# Patient Record
Sex: MALE | Race: WHITE | NOT HISPANIC OR LATINO | Employment: PART TIME | URBAN - NONMETROPOLITAN AREA
[De-identification: names, ages, dates, MRNs, and addresses within clinical notes are randomized per-mention and may not be internally consistent; named-entity substitution may affect disease eponyms.]

---

## 2024-02-08 ENCOUNTER — APPOINTMENT (OUTPATIENT)
Dept: PRIMARY CARE | Facility: CLINIC | Age: 49
End: 2024-02-08
Payer: COMMERCIAL

## 2024-02-27 ENCOUNTER — PATIENT OUTREACH (OUTPATIENT)
Dept: PRIMARY CARE | Facility: CLINIC | Age: 49
End: 2024-02-27
Payer: COMMERCIAL

## 2024-02-27 ENCOUNTER — TELEPHONE (OUTPATIENT)
Dept: PRIMARY CARE | Facility: CLINIC | Age: 49
End: 2024-02-27
Payer: COMMERCIAL

## 2024-02-27 NOTE — TELEPHONE ENCOUNTER
HELDER FROM [facility]  CALLING.    PATIENT DISCHARGED FROM HOSPITAL.   WILL YOU FOLLOW FOR HOME CARE ?     [phone]   OPTION 1

## 2024-02-27 NOTE — PROGRESS NOTES
Discharge Facility:  [facility]   Discharge Diagnosis:  Brain mass  cva   Admission Date:  2/20/24   Discharge Date:   2/26/24     PCP Appointment Date:  3/7/24   Specialist Appointment Date:   3/8/24   Hospital Encounter and Summary: Linked  See discharge assessment below for further details    Engagement  Call Start Time: 1230 (2/27/2024 12:36 PM)    Medications  Medications reviewed with patient/caregiver?: Yes (2/27/2024 12:36 PM)  Is the patient having any side effects they believe may be caused by any medication additions or changes?: No (2/27/2024 12:36 PM)  Does the patient have all medications ordered at discharge?: Yes (2/27/2024 12:36 PM)  Prescription Comments: scripts losartan , norvasc, keppra, decadron, protonix, senna, (2/27/2024 12:36 PM)  Is the patient taking all medications as directed (includes completed medication regime)?: Yes (2/27/2024 12:36 PM)  Care Management Interventions: Provided patient education (2/27/2024 12:36 PM)  Medication Comments: Pt denies problems obtaining or affording medication (2/27/2024 12:36 PM)    Appointments  Does the patient have a primary care provider?: Yes (2/27/2024 12:36 PM)  Care Management Interventions: Verified appointment date/time/provider (2/27/2024 12:36 PM)  Has the patient kept scheduled appointments due by today?: Yes (2/27/2024 12:36 PM)  Care Management Interventions: Advised patient to keep appointment (2/27/2024 12:36 PM)    Self Management  What is the home health agency?: yes (2/27/2024 12:36 PM)  Has home health visited the patient within 72 hours of discharge?: Yes (2/27/2024 12:36 PM)  What Durable Medical Equipment (DME) was ordered?: cane (2/27/2024 12:36 PM)    Patient Teaching  Does the patient have access to their discharge instructions?: Yes (2/27/2024 12:36 PM)  Care Management Interventions: Reviewed instructions with patient (2/27/2024 12:36 PM)  What is the patient's perception of their health status since  discharge?: Improving (2/27/2024 12:36 PM)  Is the patient/caregiver able to teach back the hierarchy of who to call/visit for symptoms/problems? PCP, Specialist, Home Health nurse, Urgent Care, ED, 911: Yes (2/27/2024 12:36 PM)  Patient/Caregiver Education Comments: This CM spoke with pt via phone. Pt reports doing well at home since discharge. New meds reviewed. Pt denies CP and SOB. Pt aware of my availability for non-emergent concerns. Contact info provided to patient (2/27/2024 12:36 PM)    '      losartan (COZAAR) 50 MG tablet  Take 1 (one) tablet (50 mg total) by mouth daily with lunch . 90 tablet  0 02/26/2024 05/26/2024   amLODIPine (NORVASC) 10 MG tablet  Take 1 (one) tablet (10 mg total) by mouth daily Start: 02/27/24. 90 tablet  0 02/27/2024 05/27/2024   levETIRAcetam (KEPPRA) 500 MG tablet  Take 1 (one) tablet (500 mg total) by mouth 2 (two) times a day . 60 tablet  0 02/26/2024 03/27/2024   dexAMETHasone (DECADRON) 4 MG tablet  Take 1 (one) tablet (4 mg total) by mouth See Admin Instructions 4 mg 3 times a day for 3 days then 4 mg 2 times a day for 3 days then 4 mg daily for 3 days . 18 tablet  0 02/26/2024 03/27/2024   pantoprazole (PROTONIX) 40 MG tablet  Take 1 (one) tablet (40 mg total) by mouth daily . 30 tablet  0 02/26/2024 03/27/2024   senna (SENOKOT) 8.6 mg tablet  Take 1 (one) tablet (8.6 mg total) by mouth 2 (two) times a day as needed for constipation . 30 tablet  0 02/26/2024 03/27/2024   ondansetron (ZOFRAN-ODT) 4 MG disintegrating tablet  Dissolve 1 (one) tablet (4 mg total) on top of tongue every 6 (six) hours as needed . 20 tablet  0 02/26/2024 03/04/2024   losartan (COZAAR) 50 MG tablet  Take 1 (one) tablet (50 mg total) by mouth daily with lunch . 30 tablet  0 02/26/2024 02/26/2024   amLODIPine (NORVASC) 10 MG tablet

## 2024-02-28 ENCOUNTER — TELEPHONE (OUTPATIENT)
Dept: PRIMARY CARE | Facility: CLINIC | Age: 49
End: 2024-02-28
Payer: COMMERCIAL

## 2024-02-28 NOTE — TELEPHONE ENCOUNTER
BRYANNA: SHIRLEY,  WITH [facility] CALLED TO REPORT PATIENT HAD CRANIOTOMY. MASS OF UNCERTAIN BEHAVIOR. IS SCHEDULED FOR FOLLOW UP 3/11/2024. SHE IS TRYING TO OBTAIN PATHOLOGY FOR YOUR REVIEW.           SHIRLEY: [phone]

## 2024-03-05 ENCOUNTER — TELEPHONE (OUTPATIENT)
Dept: PRIMARY CARE | Facility: CLINIC | Age: 49
End: 2024-03-05
Payer: COMMERCIAL

## 2024-03-05 NOTE — TELEPHONE ENCOUNTER
MARY ELLEN VALENCIA, HOME HEALTH PHYSICAL THERAPIST, CALLED REQUESTING A SCRIPT FOR LONG HANDLE BACK SPONGE, SOCK AID, REACHER, TOILET SAFETY FRAME, AND EXTENDED BATH TRANSFER BENCH. PLEASE ADVISE

## 2024-03-07 ENCOUNTER — APPOINTMENT (OUTPATIENT)
Dept: PRIMARY CARE | Facility: CLINIC | Age: 49
End: 2024-03-07
Payer: COMMERCIAL

## 2024-03-07 ENCOUNTER — OFFICE VISIT (OUTPATIENT)
Dept: PRIMARY CARE | Facility: CLINIC | Age: 49
End: 2024-03-07
Payer: COMMERCIAL

## 2024-03-07 VITALS
HEIGHT: 68 IN | DIASTOLIC BLOOD PRESSURE: 86 MMHG | BODY MASS INDEX: 32.05 KG/M2 | OXYGEN SATURATION: 99 % | SYSTOLIC BLOOD PRESSURE: 138 MMHG | WEIGHT: 211.5 LBS | HEART RATE: 116 BPM

## 2024-03-07 DIAGNOSIS — I10 ESSENTIAL HYPERTENSION, BENIGN: Primary | ICD-10-CM

## 2024-03-07 PROCEDURE — 3079F DIAST BP 80-89 MM HG: CPT | Performed by: FAMILY MEDICINE

## 2024-03-07 PROCEDURE — 99213 OFFICE O/P EST LOW 20 MIN: CPT | Performed by: FAMILY MEDICINE

## 2024-03-07 PROCEDURE — 3075F SYST BP GE 130 - 139MM HG: CPT | Performed by: FAMILY MEDICINE

## 2024-03-07 PROCEDURE — 1036F TOBACCO NON-USER: CPT | Performed by: FAMILY MEDICINE

## 2024-03-07 RX ORDER — LABETALOL 100 MG/1
100 TABLET, FILM COATED ORAL 2 TIMES DAILY
Qty: 60 TABLET | Refills: 1 | Status: SHIPPED | OUTPATIENT
Start: 2024-03-07 | End: 2024-04-30 | Stop reason: SDUPTHER

## 2024-03-07 RX ORDER — ONDANSETRON 4 MG/1
4 TABLET, ORALLY DISINTEGRATING ORAL EVERY 6 HOURS PRN
COMMUNITY
Start: 2024-02-26 | End: 2024-05-13 | Stop reason: ALTCHOICE

## 2024-03-07 RX ORDER — METAXALONE 800 MG/1
800 TABLET ORAL EVERY 6 HOURS PRN
COMMUNITY
End: 2024-05-13 | Stop reason: ALTCHOICE

## 2024-03-07 RX ORDER — LOSARTAN POTASSIUM 50 MG/1
50 TABLET ORAL
COMMUNITY
Start: 2024-02-26 | End: 2024-03-07 | Stop reason: ALTCHOICE

## 2024-03-07 RX ORDER — PANTOPRAZOLE SODIUM 40 MG/1
40 TABLET, DELAYED RELEASE ORAL
COMMUNITY
Start: 2024-02-26 | End: 2024-05-13 | Stop reason: ALTCHOICE

## 2024-03-07 RX ORDER — LEVETIRACETAM 500 MG/1
500 TABLET ORAL 2 TIMES DAILY
COMMUNITY
Start: 2024-02-26 | End: 2024-03-27

## 2024-03-07 RX ORDER — DEXAMETHASONE 4 MG/1
4 TABLET ORAL
COMMUNITY
Start: 2024-02-26 | End: 2024-05-13 | Stop reason: ALTCHOICE

## 2024-03-07 RX ORDER — AMLODIPINE BESYLATE 10 MG/1
10 TABLET ORAL
COMMUNITY
Start: 2024-02-27 | End: 2024-04-30 | Stop reason: SDUPTHER

## 2024-03-07 ASSESSMENT — ENCOUNTER SYMPTOMS
DIFFICULTY URINATING: 0
FATIGUE: 1
FLANK PAIN: 0
HEMATURIA: 0
PALPITATIONS: 0
FREQUENCY: 1
DIARRHEA: 0
COUGH: 0
SHORTNESS OF BREATH: 0
CONSTIPATION: 0
NAUSEA: 0
DYSURIA: 0

## 2024-03-07 ASSESSMENT — PATIENT HEALTH QUESTIONNAIRE - PHQ9
1. LITTLE INTEREST OR PLEASURE IN DOING THINGS: NOT AT ALL
2. FEELING DOWN, DEPRESSED OR HOPELESS: NOT AT ALL
SUM OF ALL RESPONSES TO PHQ9 QUESTIONS 1 AND 2: 0

## 2024-03-07 NOTE — PROGRESS NOTES
"Subjective   Patient ID: Raymundo Mora is a 48 y.o. male who presents for Annual Exam.    HPI   Has been on amlodipine the whole time.  The lisinopril allergy was remote with another doctor.  About 2 years I started him on hydrochlorothiazide, but he never took it.  In the hospital after the diagnosis of the brain tumor, they started him on losartan.  But has had increasing urination issues with that medication.    Continue amlodipine milligrams daily.  Stop losartan  And labetalol 100 mg twice a day  Office visit 2 weeks    Has a follow-up with Dr. Flores tomorrow, status post surgery.    Review of Systems   Constitutional:  Positive for fatigue.   Respiratory:  Negative for cough and shortness of breath.    Cardiovascular:  Negative for chest pain and palpitations.   Gastrointestinal:  Negative for constipation, diarrhea and nausea.   Genitourinary:  Positive for frequency and urgency. Negative for difficulty urinating, dysuria, flank pain and hematuria.       Objective   /86   Pulse (!) 116   Ht 1.727 m (5' 8\")   Wt 95.9 kg (211 lb 8 oz)   SpO2 99%   BMI 32.16 kg/m²     Physical Exam  Constitutional:       Appearance: Normal appearance.   HENT:      Head: Normocephalic and atraumatic.   Cardiovascular:      Rate and Rhythm: Normal rate and regular rhythm.      Heart sounds: Normal heart sounds.   Pulmonary:      Effort: Pulmonary effort is normal.      Breath sounds: Normal breath sounds.   Skin:     General: Skin is warm and dry.   Neurological:      General: No focal deficit present.      Mental Status: He is alert and oriented to person, place, and time.   Psychiatric:         Mood and Affect: Mood normal.         Behavior: Behavior normal.         Thought Content: Thought content normal.         Judgment: Judgment normal.         Assessment/Plan   Problem List Items Addressed This Visit             ICD-10-CM    Essential hypertension, benign - Primary I10    Relevant Medications    labetalol " (Normodyne) 100 mg tablet

## 2024-03-11 ENCOUNTER — APPOINTMENT (OUTPATIENT)
Dept: PRIMARY CARE | Facility: CLINIC | Age: 49
End: 2024-03-11
Payer: COMMERCIAL

## 2024-03-12 ENCOUNTER — PATIENT OUTREACH (OUTPATIENT)
Dept: PRIMARY CARE | Facility: CLINIC | Age: 49
End: 2024-03-12
Payer: COMMERCIAL

## 2024-03-12 NOTE — PROGRESS NOTES
Unable to reach patient for call back after patient's follow up appointment with PCP.   FRANCIAM with call back number for patient to call if needed   If no voicemail available call attempts x 2 were made to contact the patient to assist with any questions or concerns patient may have.

## 2024-03-13 ENCOUNTER — APPOINTMENT (OUTPATIENT)
Dept: PRIMARY CARE | Facility: CLINIC | Age: 49
End: 2024-03-13
Payer: COMMERCIAL

## 2024-03-15 PROBLEM — I61.9 CVA (CEREBROVASCULAR ACCIDENT DUE TO INTRACEREBRAL HEMORRHAGE) (MULTI): Status: ACTIVE | Noted: 2024-02-20

## 2024-03-15 PROBLEM — G93.89 BRAIN MASS: Status: ACTIVE | Noted: 2024-02-21

## 2024-03-15 PROBLEM — Z98.890 HX OF CRANIOTOMY: Status: ACTIVE | Noted: 2024-03-15

## 2024-03-25 ENCOUNTER — APPOINTMENT (OUTPATIENT)
Dept: PRIMARY CARE | Facility: CLINIC | Age: 49
End: 2024-03-25
Payer: COMMERCIAL

## 2024-04-12 ENCOUNTER — PATIENT OUTREACH (OUTPATIENT)
Dept: PRIMARY CARE | Facility: CLINIC | Age: 49
End: 2024-04-12
Payer: COMMERCIAL

## 2024-04-12 NOTE — PROGRESS NOTES
Unable to reach patient for discharge follow up call.   LVM with call back number for patient to call if needed   If no voicemail available call attempts x 2 were made to contact the patient to assist with any questions or concerns patient may have.

## 2024-04-30 ENCOUNTER — APPOINTMENT (OUTPATIENT)
Dept: PRIMARY CARE | Facility: CLINIC | Age: 49
End: 2024-04-30
Payer: COMMERCIAL

## 2024-04-30 DIAGNOSIS — I10 ESSENTIAL HYPERTENSION, BENIGN: ICD-10-CM

## 2024-04-30 RX ORDER — CARVEDILOL PHOSPHATE 20 MG/1
20 CAPSULE, EXTENDED RELEASE ORAL DAILY
Qty: 90 CAPSULE | Refills: 3 | OUTPATIENT
Start: 2024-04-30 | End: 2025-04-30

## 2024-04-30 RX ORDER — LABETALOL 100 MG/1
100 TABLET, FILM COATED ORAL 2 TIMES DAILY
Qty: 180 TABLET | Refills: 3 | Status: SHIPPED | OUTPATIENT
Start: 2024-04-30 | End: 2025-04-30

## 2024-04-30 RX ORDER — AMLODIPINE BESYLATE 10 MG/1
10 TABLET ORAL
Qty: 90 TABLET | Refills: 3 | Status: SHIPPED | OUTPATIENT
Start: 2024-04-30 | End: 2025-04-30

## 2024-05-09 ENCOUNTER — PATIENT OUTREACH (OUTPATIENT)
Dept: PRIMARY CARE | Facility: CLINIC | Age: 49
End: 2024-05-09
Payer: COMMERCIAL

## 2024-05-09 NOTE — PROGRESS NOTES
Final call back to assess needs..  LVM for pt   Contact info provided.    [phone]- pt returned call. Pt states he Is doing well has no questions or concerns at this time. Contact info provided.

## 2024-05-13 ENCOUNTER — OFFICE VISIT (OUTPATIENT)
Dept: PRIMARY CARE | Facility: CLINIC | Age: 49
End: 2024-05-13
Payer: COMMERCIAL

## 2024-05-13 VITALS
HEIGHT: 68 IN | SYSTOLIC BLOOD PRESSURE: 124 MMHG | HEART RATE: 88 BPM | WEIGHT: 206.4 LBS | DIASTOLIC BLOOD PRESSURE: 82 MMHG | BODY MASS INDEX: 31.28 KG/M2 | OXYGEN SATURATION: 99 %

## 2024-05-13 DIAGNOSIS — I10 ESSENTIAL HYPERTENSION, BENIGN: Primary | ICD-10-CM

## 2024-05-13 PROCEDURE — 1036F TOBACCO NON-USER: CPT | Performed by: FAMILY MEDICINE

## 2024-05-13 PROCEDURE — 99213 OFFICE O/P EST LOW 20 MIN: CPT | Performed by: FAMILY MEDICINE

## 2024-05-13 PROCEDURE — 3074F SYST BP LT 130 MM HG: CPT | Performed by: FAMILY MEDICINE

## 2024-05-13 PROCEDURE — 3079F DIAST BP 80-89 MM HG: CPT | Performed by: FAMILY MEDICINE

## 2024-05-13 RX ORDER — GABAPENTIN 400 MG/1
400 CAPSULE ORAL 3 TIMES DAILY
COMMUNITY

## 2024-05-13 ASSESSMENT — ENCOUNTER SYMPTOMS
HEADACHES: 0
PALPITATIONS: 0
FATIGUE: 0
SHORTNESS OF BREATH: 0

## 2024-05-13 NOTE — PROGRESS NOTES
"Subjective   Patient ID: Raymundo Mora is a 48 y.o. male who presents for Follow-up (Patient here for follow-up.  Denies any complaints at present.).    HPI   Overall he is doing great.  He is feeling well.  He did have a little bit of troubles with the Keppra and was switched over to gabapentin for seizure prophylaxis.  He feels much better on that medicine.  Tolerating the amlodipine has been on that for a while.  Blood pressure is much better on the labetalol, a little bit of the ED.  But it is okay for now.  His weight is down between 5 and 10 pounds with better diet.  At this point since things are stable I do not want to change anything.  If needed in the future we can try to stop the labetalol and use something else if needed.  Will see how his weight loss goes on his blood pressure.    Still following up with neurosurgery.  Not sure if he absolutely needs a referral to oncology but they were going to do that to the [facility].    Office visit 3 months    Review of Systems   Constitutional:  Negative for fatigue.   Respiratory:  Negative for shortness of breath.    Cardiovascular:  Negative for chest pain and palpitations.   Neurological:  Negative for headaches.       Objective   /82 (BP Location: Left arm, Patient Position: Sitting, BP Cuff Size: Adult)   Pulse 88   Ht 1.727 m (5' 8\")   Wt 93.6 kg (206 lb 6.4 oz)   SpO2 99%   BMI 31.38 kg/m²     Physical Exam  Constitutional:       Appearance: Normal appearance.   HENT:      Head: Normocephalic and atraumatic.   Cardiovascular:      Rate and Rhythm: Normal rate and regular rhythm.      Heart sounds: Normal heart sounds.   Pulmonary:      Effort: Pulmonary effort is normal.      Breath sounds: Normal breath sounds.   Skin:     General: Skin is warm and dry.   Neurological:      General: No focal deficit present.      Mental Status: He is alert and oriented to person, place, and time.   Psychiatric:         Mood and Affect: Mood normal.    "      Behavior: Behavior normal.         Thought Content: Thought content normal.         Judgment: Judgment normal.         Assessment/Plan   Problem List Items Addressed This Visit             ICD-10-CM    Essential hypertension, benign - Primary I10

## 2024-08-13 ENCOUNTER — APPOINTMENT (OUTPATIENT)
Dept: PRIMARY CARE | Facility: CLINIC | Age: 49
End: 2024-08-13
Payer: COMMERCIAL

## 2024-09-09 ENCOUNTER — APPOINTMENT (OUTPATIENT)
Age: 49
End: 2024-09-09
Payer: COMMERCIAL

## 2024-09-09 VITALS
BODY MASS INDEX: 32.89 KG/M2 | OXYGEN SATURATION: 92 % | WEIGHT: 217 LBS | SYSTOLIC BLOOD PRESSURE: 114 MMHG | HEART RATE: 85 BPM | HEIGHT: 68 IN | DIASTOLIC BLOOD PRESSURE: 75 MMHG

## 2024-09-09 DIAGNOSIS — M54.50 CHRONIC MIDLINE LOW BACK PAIN WITHOUT SCIATICA: ICD-10-CM

## 2024-09-09 DIAGNOSIS — I10 ESSENTIAL HYPERTENSION, BENIGN: Primary | ICD-10-CM

## 2024-09-09 DIAGNOSIS — G89.29 CHRONIC MIDLINE LOW BACK PAIN WITHOUT SCIATICA: ICD-10-CM

## 2024-09-09 PROBLEM — G93.89 BRAIN MASS: Status: RESOLVED | Noted: 2024-02-21 | Resolved: 2024-09-09

## 2024-09-09 PROBLEM — Z87.898 HISTORY OF BRAIN TUMOR: Status: ACTIVE | Noted: 2024-09-09

## 2024-09-09 PROBLEM — I61.9 CVA (CEREBROVASCULAR ACCIDENT DUE TO INTRACEREBRAL HEMORRHAGE) (MULTI): Status: RESOLVED | Noted: 2024-02-20 | Resolved: 2024-09-09

## 2024-09-09 PROCEDURE — 3078F DIAST BP <80 MM HG: CPT | Performed by: FAMILY MEDICINE

## 2024-09-09 PROCEDURE — 3074F SYST BP LT 130 MM HG: CPT | Performed by: FAMILY MEDICINE

## 2024-09-09 PROCEDURE — 99214 OFFICE O/P EST MOD 30 MIN: CPT | Performed by: FAMILY MEDICINE

## 2024-09-09 PROCEDURE — 3008F BODY MASS INDEX DOCD: CPT | Performed by: FAMILY MEDICINE

## 2024-09-09 PROCEDURE — 1036F TOBACCO NON-USER: CPT | Performed by: FAMILY MEDICINE

## 2024-09-09 RX ORDER — GABAPENTIN 400 MG/1
400 CAPSULE ORAL 3 TIMES DAILY
Qty: 270 CAPSULE | Refills: 3 | Status: SHIPPED | OUTPATIENT
Start: 2024-09-09

## 2024-09-09 RX ORDER — AMLODIPINE BESYLATE 10 MG/1
10 TABLET ORAL
Qty: 90 TABLET | Refills: 3 | Status: SHIPPED | OUTPATIENT
Start: 2024-09-09 | End: 2025-09-09

## 2024-09-09 RX ORDER — LABETALOL 100 MG/1
100 TABLET, FILM COATED ORAL 2 TIMES DAILY
Qty: 180 TABLET | Refills: 3 | Status: SHIPPED | OUTPATIENT
Start: 2024-09-09 | End: 2025-09-09

## 2024-09-09 ASSESSMENT — PATIENT HEALTH QUESTIONNAIRE - PHQ9
1. LITTLE INTEREST OR PLEASURE IN DOING THINGS: SEVERAL DAYS
SUM OF ALL RESPONSES TO PHQ9 QUESTIONS 1 & 2: 1
2. FEELING DOWN, DEPRESSED OR HOPELESS: NOT AT ALL

## 2024-09-09 ASSESSMENT — ENCOUNTER SYMPTOMS
HEADACHES: 0
BACK PAIN: 1
PALPITATIONS: 0
SHORTNESS OF BREATH: 0
FATIGUE: 0

## 2024-09-09 ASSESSMENT — PAIN SCALES - GENERAL: PAINLEVEL: 3

## 2024-09-09 NOTE — PROGRESS NOTES
"Subjective   Patient ID: Raymundo Mora is a 48 y.o. male who presents for Follow-up (3 mos follow up).    HPI   Does not need the gabapentin for seizure prophylaxis anymore.  Dr. Cardona pain management had him on it for his chronic low back pain.  Will refill the medication today.  I have personally reviewed the [facility] report for this patient. This report is viewed and saved into the electronic medical record. I have considered the risks of abuse, dependence, addiction and diversion. I believe that it is clinically appropriate for this patient to be prescribed this medication.    Blood pressure is doing okay on the amlodipine the labetalol.  Since things are where they need to be is not having any troubles with the medications will not change any doses at this time.  No lightheadedness or dizziness.    Did have a PSA test done last year which was less than 1.  Also did lipid levels.  Will talk about colon cancer screening at the next office visit, likely wellness.  Office visit 6 months.  Will need labs, we will talk about those labs first before we order them.    Review of Systems   Constitutional:  Negative for fatigue.   Respiratory:  Negative for shortness of breath.    Cardiovascular:  Negative for chest pain and palpitations.   Musculoskeletal:  Positive for back pain and gait problem.   Skin:  Negative for rash.   Neurological:  Negative for headaches.       Objective   /75   Pulse 85   Ht 1.727 m (5' 8\")   Wt 98.4 kg (217 lb)   SpO2 92%   BMI 32.99 kg/m²     Physical Exam  Constitutional:       Appearance: Normal appearance.   HENT:      Head: Normocephalic and atraumatic.   Cardiovascular:      Rate and Rhythm: Normal rate and regular rhythm.      Heart sounds: Normal heart sounds.   Pulmonary:      Effort: Pulmonary effort is normal.      Breath sounds: Normal breath sounds.   Skin:     General: Skin is warm and dry.   Neurological:      General: No focal deficit present.      Mental Status: He " is alert and oriented to person, place, and time.   Psychiatric:         Mood and Affect: Mood normal.         Behavior: Behavior normal.         Thought Content: Thought content normal.         Judgment: Judgment normal.         Assessment/Plan   Problem List Items Addressed This Visit             ICD-10-CM    Essential hypertension, benign - Primary I10    Relevant Medications    amLODIPine (Norvasc) 10 mg tablet    labetalol (Normodyne) 100 mg tablet    Chronic midline low back pain without sciatica M54.50, G89.29    Relevant Medications    gabapentin (Neurontin) 400 mg capsule

## 2025-03-10 ENCOUNTER — APPOINTMENT (OUTPATIENT)
Age: 50
End: 2025-03-10
Payer: COMMERCIAL